# Patient Record
Sex: MALE | Race: WHITE | Employment: OTHER | URBAN - METROPOLITAN AREA
[De-identification: names, ages, dates, MRNs, and addresses within clinical notes are randomized per-mention and may not be internally consistent; named-entity substitution may affect disease eponyms.]

---

## 2021-01-06 ENCOUNTER — APPOINTMENT (OUTPATIENT)
Dept: ULTRASOUND IMAGING | Age: 76
End: 2021-01-06
Attending: EMERGENCY MEDICINE

## 2021-01-06 ENCOUNTER — HOSPITAL ENCOUNTER (EMERGENCY)
Age: 76
Discharge: HOME | End: 2021-01-06
Attending: EMERGENCY MEDICINE

## 2021-01-06 VITALS
BODY MASS INDEX: 24.79 KG/M2 | SYSTOLIC BLOOD PRESSURE: 140 MMHG | TEMPERATURE: 98.6 F | WEIGHT: 183 LBS | OXYGEN SATURATION: 93 % | HEART RATE: 95 BPM | HEIGHT: 72 IN | DIASTOLIC BLOOD PRESSURE: 90 MMHG | RESPIRATION RATE: 20 BRPM

## 2021-01-06 DIAGNOSIS — I82.4Y2 ACUTE DEEP VEIN THROMBOSIS (DVT) OF PROXIMAL VEIN OF LEFT LOWER EXTREMITY (HCC): Primary | ICD-10-CM

## 2021-01-06 PROCEDURE — 93971 EXTREMITY STUDY: CPT

## 2021-01-06 PROCEDURE — 99284 EMERGENCY DEPT VISIT MOD MDM: CPT

## 2021-01-06 PROCEDURE — 74011250637 HC RX REV CODE- 250/637: Performed by: EMERGENCY MEDICINE

## 2021-01-06 RX ADMIN — APIXABAN 10 MG: 5 TABLET, FILM COATED ORAL at 14:41

## 2021-01-06 NOTE — ED NOTES
I have reviewed discharge instructions with the patient. The patient verbalized understanding. Patient left ED via Discharge Method: ambulatory to Home with adult daughter. Opportunity for questions and clarification provided. Patient given 1 scripts. To continue your aftercare when you leave the hospital, you may receive an automated call from our care team to check in on how you are doing. This is a free service and part of our promise to provide the best care and service to meet your aftercare needs.  If you have questions, or wish to unsubscribe from this service please call [phone]. Thank you for Choosing our [facility].

## 2021-01-06 NOTE — DISCHARGE INSTRUCTIONS
Follow-up with your doctor at the [facility] within a week. Return to the emergency department if your symptoms worsen or if you are unable to get your prescription filled for what ever reason.

## 2021-01-06 NOTE — ED TRIAGE NOTES
Pt to triage in wheelchair from [place] after suspicion of blood clot in left leg and is warm to the touch and stretched out and cold foot. Blood clots in bilateral legs in 2016, denies taking blood thinners. Pt having shob and wears 2 L at home.

## 2021-01-20 ENCOUNTER — APPOINTMENT (OUTPATIENT)
Dept: CT IMAGING | Age: 76
End: 2021-01-20
Attending: EMERGENCY MEDICINE
Payer: OTHER GOVERNMENT

## 2021-01-20 ENCOUNTER — HOSPITAL ENCOUNTER (EMERGENCY)
Age: 76
Discharge: HOME | End: 2021-01-20
Attending: EMERGENCY MEDICINE
Payer: OTHER GOVERNMENT

## 2021-01-20 VITALS
OXYGEN SATURATION: 98 % | HEART RATE: 83 BPM | TEMPERATURE: 98.3 F | BODY MASS INDEX: 24.79 KG/M2 | HEIGHT: 72 IN | RESPIRATION RATE: 19 BRPM | DIASTOLIC BLOOD PRESSURE: 78 MMHG | WEIGHT: 183 LBS | SYSTOLIC BLOOD PRESSURE: 134 MMHG

## 2021-01-20 DIAGNOSIS — I82.412 ACUTE DEEP VEIN THROMBOSIS (DVT) OF FEMORAL VEIN OF LEFT LOWER EXTREMITY (HCC): Primary | ICD-10-CM

## 2021-01-20 DIAGNOSIS — I73.9 CLAUDICATION (HCC): ICD-10-CM

## 2021-01-20 LAB
ALBUMIN SERPL-MCNC: 3.6 G/DL (ref 3.2–4.6)
ALBUMIN/GLOB SERPL: 1 {RATIO} (ref 1.2–3.5)
ALP SERPL-CCNC: 132 U/L (ref 50–136)
ALT SERPL-CCNC: 28 U/L (ref 12–65)
ANION GAP SERPL CALC-SCNC: 9 MMOL/L (ref 7–16)
APTT PPP: 35.8 SEC (ref 24.1–35.1)
AST SERPL-CCNC: 19 U/L (ref 15–37)
ATRIAL RATE: 83 BPM
BASOPHILS # BLD: 0.1 K/UL (ref 0–0.2)
BASOPHILS NFR BLD: 1 % (ref 0–2)
BILIRUB SERPL-MCNC: 0.3 MG/DL (ref 0.2–1.1)
BNP SERPL-MCNC: 193 PG/ML
BUN SERPL-MCNC: 16 MG/DL (ref 8–23)
CALCIUM SERPL-MCNC: 9.4 MG/DL (ref 8.3–10.4)
CALCULATED P AXIS, ECG09: 94 DEGREES
CALCULATED R AXIS, ECG10: 79 DEGREES
CALCULATED T AXIS, ECG11: 87 DEGREES
CHLORIDE SERPL-SCNC: 102 MMOL/L (ref 98–107)
CO2 SERPL-SCNC: 28 MMOL/L (ref 21–32)
CREAT SERPL-MCNC: 1.3 MG/DL (ref 0.8–1.5)
DIAGNOSIS, 93000: NORMAL
DIFFERENTIAL METHOD BLD: ABNORMAL
EOSINOPHIL # BLD: 0.2 K/UL (ref 0–0.8)
EOSINOPHIL NFR BLD: 2 % (ref 0.5–7.8)
ERYTHROCYTE [DISTWIDTH] IN BLOOD BY AUTOMATED COUNT: 12.9 % (ref 11.9–14.6)
GLOBULIN SER CALC-MCNC: 3.7 G/DL (ref 2.3–3.5)
GLUCOSE SERPL-MCNC: 127 MG/DL (ref 65–100)
HCT VFR BLD AUTO: 43.5 % (ref 41.1–50.3)
HGB BLD-MCNC: 14.6 G/DL (ref 13.6–17.2)
IMM GRANULOCYTES # BLD AUTO: 0 K/UL (ref 0–0.5)
IMM GRANULOCYTES NFR BLD AUTO: 0 % (ref 0–5)
INR PPP: 1.3
LYMPHOCYTES # BLD: 3 K/UL (ref 0.5–4.6)
LYMPHOCYTES NFR BLD: 29 % (ref 13–44)
MAGNESIUM SERPL-MCNC: 2.2 MG/DL (ref 1.8–2.4)
MCH RBC QN AUTO: 33.1 PG (ref 26.1–32.9)
MCHC RBC AUTO-ENTMCNC: 33.6 G/DL (ref 31.4–35)
MCV RBC AUTO: 98.6 FL (ref 79.6–97.8)
MONOCYTES # BLD: 0.9 K/UL (ref 0.1–1.3)
MONOCYTES NFR BLD: 8 % (ref 4–12)
NEUTS SEG # BLD: 6.2 K/UL (ref 1.7–8.2)
NEUTS SEG NFR BLD: 60 % (ref 43–78)
NRBC # BLD: 0 K/UL (ref 0–0.2)
P-R INTERVAL, ECG05: 164 MS
PLATELET # BLD AUTO: 291 K/UL (ref 150–450)
PMV BLD AUTO: 9.4 FL (ref 9.4–12.3)
POTASSIUM SERPL-SCNC: 3.6 MMOL/L (ref 3.5–5.1)
PROT SERPL-MCNC: 7.3 G/DL (ref 6.3–8.2)
PROTHROMBIN TIME: 16.4 SEC (ref 12.5–14.7)
Q-T INTERVAL, ECG07: 344 MS
QRS DURATION, ECG06: 80 MS
QTC CALCULATION (BEZET), ECG08: 404 MS
RBC # BLD AUTO: 4.41 M/UL (ref 4.23–5.6)
SODIUM SERPL-SCNC: 139 MMOL/L (ref 136–145)
TROPONIN-HIGH SENSITIVITY: 10 PG/ML (ref 0–14)
VENTRICULAR RATE, ECG03: 83 BPM
WBC # BLD AUTO: 10.3 K/UL (ref 4.3–11.1)

## 2021-01-20 PROCEDURE — 93005 ELECTROCARDIOGRAM TRACING: CPT | Performed by: EMERGENCY MEDICINE

## 2021-01-20 PROCEDURE — 85730 THROMBOPLASTIN TIME PARTIAL: CPT

## 2021-01-20 PROCEDURE — 99285 EMERGENCY DEPT VISIT HI MDM: CPT

## 2021-01-20 PROCEDURE — 83735 ASSAY OF MAGNESIUM: CPT

## 2021-01-20 PROCEDURE — 85025 COMPLETE CBC W/AUTO DIFF WBC: CPT

## 2021-01-20 PROCEDURE — 80053 COMPREHEN METABOLIC PANEL: CPT

## 2021-01-20 PROCEDURE — 83880 ASSAY OF NATRIURETIC PEPTIDE: CPT

## 2021-01-20 PROCEDURE — 85610 PROTHROMBIN TIME: CPT

## 2021-01-20 PROCEDURE — 74011000258 HC RX REV CODE- 258: Performed by: EMERGENCY MEDICINE

## 2021-01-20 PROCEDURE — 74011000636 HC RX REV CODE- 636: Performed by: EMERGENCY MEDICINE

## 2021-01-20 PROCEDURE — 71260 CT THORAX DX C+: CPT

## 2021-01-20 PROCEDURE — 84484 ASSAY OF TROPONIN QUANT: CPT

## 2021-01-20 RX ORDER — GABAPENTIN 300 MG/1
300 CAPSULE ORAL
COMMUNITY

## 2021-01-20 RX ORDER — GABAPENTIN 300 MG/1
300 CAPSULE ORAL 2 TIMES DAILY
COMMUNITY

## 2021-01-20 RX ORDER — BUDESONIDE AND FORMOTEROL FUMARATE DIHYDRATE 160; 4.5 UG/1; UG/1
2 AEROSOL RESPIRATORY (INHALATION) 2 TIMES DAILY
COMMUNITY

## 2021-01-20 RX ORDER — OMEPRAZOLE 20 MG/1
20 CAPSULE, DELAYED RELEASE ORAL 2 TIMES DAILY
COMMUNITY

## 2021-01-20 RX ORDER — SIMVASTATIN 40 MG/1
20 TABLET, FILM COATED ORAL
COMMUNITY

## 2021-01-20 RX ORDER — FUROSEMIDE 20 MG/1
20 TABLET ORAL DAILY
COMMUNITY

## 2021-01-20 RX ORDER — SODIUM CHLORIDE 0.9 % (FLUSH) 0.9 %
10 SYRINGE (ML) INJECTION
Status: COMPLETED | OUTPATIENT
Start: 2021-01-20 | End: 2021-01-20

## 2021-01-20 RX ORDER — HYDROCODONE BITARTRATE AND ACETAMINOPHEN 10; 325 MG/1; MG/1
1 TABLET ORAL
COMMUNITY

## 2021-01-20 RX ORDER — BISMUTH SUBSALICYLATE 262 MG
1 TABLET,CHEWABLE ORAL DAILY
COMMUNITY

## 2021-01-20 RX ORDER — DIPHENHYDRAMINE HCL 25 MG
75 TABLET ORAL DAILY
COMMUNITY

## 2021-01-20 RX ORDER — ASCORBIC ACID 500 MG
500 TABLET ORAL DAILY
COMMUNITY

## 2021-01-20 RX ORDER — HYDROCODONE BITARTRATE AND ACETAMINOPHEN 5; 325 MG/1; MG/1
1 TABLET ORAL ONCE
Status: DISCONTINUED | OUTPATIENT
Start: 2021-01-20 | End: 2021-01-20 | Stop reason: HOSPADM

## 2021-01-20 RX ORDER — TAMSULOSIN HYDROCHLORIDE 0.4 MG/1
0.4 CAPSULE ORAL DAILY
COMMUNITY

## 2021-01-20 RX ADMIN — SODIUM CHLORIDE 100 ML: 900 INJECTION, SOLUTION INTRAVENOUS at 13:18

## 2021-01-20 RX ADMIN — Medication 10 ML: at 13:18

## 2021-01-20 RX ADMIN — IOPAMIDOL 100 ML: 755 INJECTION, SOLUTION INTRAVENOUS at 13:18

## 2021-01-20 NOTE — ED NOTES
I have reviewed discharge instructions with the patient. The patient verbalized understanding. Patient left ED via Discharge Method: wheelchair to Home with family. Opportunity for questions and clarification provided. Patient given 0 scripts. To continue your aftercare when you leave the hospital, you may receive an automated call from our care team to check in on how you are doing. This is a free service and part of our promise to provide the best care and service to meet your aftercare needs.  If you have questions, or wish to unsubscribe from this service please call [phone]. Thank you for Choosing our [facility] Emergency Department.

## 2021-01-20 NOTE — ED TRIAGE NOTES
Pt to triage in wheelchair from [place] complaining of left foot pain. States he was diagnosed with blood clot in the foot 2 weeks ago and it has gotten bigger since then. Pt o2 89% in triage [facility] O2, states he wears 2L at home. Pt has been taking 2 different blood thinners since discharged 2 weeks ago every 12 hours. Foot warm to touch with 2+ pitting edema.

## 2021-01-20 NOTE — ED PROVIDER NOTES
Diana Taveras is a 67 y.o. male seen on 1/20/2021 in the [facility] EMERGENCY DEPT in room ER12/12. Chief Complaint   Patient presents with    Blood Clot     HPI: 69-year-old male with known left lower extremity blood clot presents the emergency department with increased pain and swelling. Patient was seen in our emergency department on 1/06/21 and diagnosed with a DVT of his left lower extremity from common femoral vein distally. Patient was not having any chest pain or shortness of breath and was discharged with prescription for Eliquis. Patient states that he went to the pharmacy and he was told that the Eliquis would cost him $715 therefore he did not get it filled. Patient states that he called the [place] and they provided him with a different blood thinner. He states that he finally received this 4 days ago and began taking it twice a day. He is unsure what it is called. Patient presented today because of increasing pain and swelling. He states that he felt like he should be getting better and he is not yet. Patient denies any new symptoms of chest pain, shortness of breath or any other concerns. Patient is on chronic oxygen supplementation secondary to his COPD. He has no other complaints this time. Historian: Patient/Previous Medical Record    REVIEW OF SYSTEMS     Review of Systems   Constitutional: Negative. HENT: Negative. Respiratory: Positive for shortness of breath (Chronic). Cardiovascular: Positive for leg swelling. Gastrointestinal: Negative. Genitourinary: Negative. Musculoskeletal: Negative. Skin: Negative. Neurological: Negative. Psychiatric/Behavioral: Negative. All other systems reviewed and are negative. PAST MEDICAL HISTORY     No past medical history on file. No past surgical history on file.   Social History     Socioeconomic History    Marital status:      Spouse name: Not on file    Number of children: Not on file    Years of education: Not on file    Highest education level: Not on file     Prior to Admission Medications   Prescriptions Last Dose Informant Patient Reported? Taking? HYDROcodone-acetaminophen (NORCO)  mg tablet 1/20/2021 at Unknown time  Yes Yes   Sig: Take 1 Tab by mouth every six (6) hours as needed for Pain. ascorbic acid, vitamin C, (Vitamin C) 500 mg tablet 1/20/2021 at Unknown time  Yes Yes   Sig: Take 500 mg by mouth daily. budesonide-formoteroL (Symbicort) 160-4.5 mcg/actuation HFAA 1/20/2021 at Unknown time  Yes Yes   Sig: Take 2 Puffs by inhalation two (2) times a day. diphenhydrAMINE (Benadryl Allergy) 25 mg tablet 1/20/2021 at Unknown time  Yes Yes   Sig: Take 75 mg by mouth daily. furosemide (Lasix) 20 mg tablet 1/20/2021 at Unknown time  Yes Yes   Sig: Take 20 mg by mouth daily. gabapentin (NEURONTIN) 300 mg capsule 1/20/2021 at Unknown time  Yes Yes   Sig: Take 300 mg by mouth two (2) times a day. 0800 and 1200   gabapentin (NEURONTIN) 300 mg capsule 1/19/2021 at Unknown time  Yes Yes   Sig: Take 300 mg by mouth nightly. 2000   multivitamin (ONE A DAY) tablet 1/20/2021 at Unknown time  Yes Yes   Sig: Take 1 Tab by mouth daily. omeprazole (PRILOSEC) 20 mg capsule 1/20/2021 at Unknown time  Yes Yes   Sig: Take 20 mg by mouth two (2) times a day. rivaroxaban (XARELTO) 15 mg tab tablet 1/20/2021 at Unknown time  Yes Yes   Sig: Take 15 mg by mouth two (2) times daily (with meals). simvastatin (ZOCOR) 40 mg tablet 1/19/2021 at Unknown time  Yes Yes   Sig: Take 20 mg by mouth nightly. tamsulosin (FLOMAX) 0.4 mg capsule 1/20/2021 at Unknown time  Yes Yes   Sig: Take 0.4 mg by mouth daily. vitamin P-H-N-lutein-minerals (OCUVITE) tablet 1/20/2021 at Unknown time  Yes Yes   Sig: Take 1 Tab by mouth daily.       Facility-Administered Medications: None     Allergies   Allergen Reactions    Morphine Anaphylaxis        PHYSICAL EXAM       Vitals: 01/20/21 1140 01/20/21 1159 01/20/21 1220 01/20/21 1259   BP: 129/80 128/81 122/79 113/72   Pulse: 87 82 94 85   Resp:  14 17 19   Temp:       SpO2: 96% 96% 96% 95%    Vital signs were reviewed. Physical Exam  Vitals signs and nursing note reviewed. Constitutional:       General: He is not in acute distress. Appearance: Normal appearance. He is not ill-appearing or toxic-appearing. HENT:      Head: Atraumatic. Mouth/Throat:      Mouth: Mucous membranes are moist.   Eyes:      Extraocular Movements: Extraocular movements intact. Neck:      Musculoskeletal: Normal range of motion. Cardiovascular:      Rate and Rhythm: Regular rhythm. Tachycardia present. Pulmonary:      Breath sounds: No wheezing or rhonchi. Comments: Conversational dyspnea and tachypnea which patient describes as his baseline shortness of breath  Chest:      Chest wall: No tenderness. Abdominal:      Palpations: Abdomen is soft. Tenderness: There is no abdominal tenderness. Musculoskeletal:         General: Swelling and tenderness present. Left lower leg: Edema present. Comments: Patient with mild swelling of his left lower extremity with mild warmth and erythema. Patient has good capillary refill. He has some mild calf tenderness to palpation. There is +1 pitting edema to the left lower extremity. Skin:     General: Skin is warm and dry. Neurological:      General: No focal deficit present. Mental Status: He is alert and oriented to person, place, and time. Psychiatric:         Mood and Affect: Mood normal.         Behavior: Behavior normal.         Thought Content:  Thought content normal.         Judgment: Judgment normal.          MEDICAL DECISION MAKING     ED Course:    Recent Results (from the past 8 hour(s))   CBC WITH AUTOMATED DIFF    Collection Time: 01/20/21 10:57 AM   Result Value Ref Range    WBC 10.3 4.3 - 11.1 K/uL    RBC 4.41 4.23 - 5.6 M/uL    HGB 14.6 13.6 - 17.2 g/dL HCT 43.5 41.1 - 50.3 %    MCV 98.6 (H) 79.6 - 97.8 FL    MCH 33.1 (H) 26.1 - 32.9 PG    MCHC 33.6 31.4 - 35.0 g/dL    RDW 12.9 11.9 - 14.6 %    PLATELET 492 948 - 355 K/uL    MPV 9.4 9.4 - 12.3 FL    ABSOLUTE NRBC 0.00 0.0 - 0.2 K/uL    DF AUTOMATED      NEUTROPHILS 60 43 - 78 %    LYMPHOCYTES 29 13 - 44 %    MONOCYTES 8 4.0 - 12.0 %    EOSINOPHILS 2 0.5 - 7.8 %    BASOPHILS 1 0.0 - 2.0 %    IMMATURE GRANULOCYTES 0 0.0 - 5.0 %    ABS. NEUTROPHILS 6.2 1.7 - 8.2 K/UL    ABS. LYMPHOCYTES 3.0 0.5 - 4.6 K/UL    ABS. MONOCYTES 0.9 0.1 - 1.3 K/UL    ABS. EOSINOPHILS 0.2 0.0 - 0.8 K/UL    ABS. BASOPHILS 0.1 0.0 - 0.2 K/UL    ABS. IMM. GRANS. 0.0 0.0 - 0.5 K/UL   METABOLIC PANEL, COMPREHENSIVE    Collection Time: 01/20/21 10:57 AM   Result Value Ref Range    Sodium 139 136 - 145 mmol/L    Potassium 3.6 3.5 - 5.1 mmol/L    Chloride 102 98 - 107 mmol/L    CO2 28 21 - 32 mmol/L    Anion gap 9 7 - 16 mmol/L    Glucose 127 (H) 65 - 100 mg/dL    BUN 16 8 - 23 MG/DL    Creatinine 1.30 0.8 - 1.5 MG/DL    GFR est AA >60 >60 ml/min/1.73m2    GFR est non-AA 57 (L) >60 ml/min/1.73m2    Calcium 9.4 8.3 - 10.4 MG/DL    Bilirubin, total 0.3 0.2 - 1.1 MG/DL    ALT (SGPT) 28 12 - 65 U/L    AST (SGOT) 19 15 - 37 U/L    Alk.  phosphatase 132 50 - 136 U/L    Protein, total 7.3 6.3 - 8.2 g/dL    Albumin 3.6 3.2 - 4.6 g/dL    Globulin 3.7 (H) 2.3 - 3.5 g/dL    A-G Ratio 1.0 (L) 1.2 - 3.5     PROTHROMBIN TIME + INR    Collection Time: 01/20/21 10:57 AM   Result Value Ref Range    Prothrombin time 16.4 (H) 12.5 - 14.7 sec    INR 1.3     PTT    Collection Time: 01/20/21 10:57 AM   Result Value Ref Range    aPTT 35.8 (H) 24.1 - 35.1 SEC   MAGNESIUM    Collection Time: 01/20/21 10:57 AM   Result Value Ref Range    Magnesium 2.2 1.8 - 2.4 mg/dL   NT-PRO BNP    Collection Time: 01/20/21 10:57 AM   Result Value Ref Range    NT pro- <450 PG/ML     Ct Chest W Cont    Result Date: 1/20/2021  CT CHEST WITH CONTRAST  1/20/2021 1:27 PM HISTORY:  Proximal DVT; patient is on anticoagulation for known DVT. Low oxygen saturation today. TECHNIQUE: The patient received 100 mL Isovue-370 nonionic IV contrast and axial images were obtained through the chest. Coronal reformatted images were generated. All CT scans at this facility used dose modulation, interactive reconstruction and/or weight based dosing when appropriate to reduce radiation dose to as low as reasonably achievable. COMPARISON:  None. FINDINGS: There are no filling defects within the main or proximal segmental pulmonary artery suggestive of emboli. The thoracic aorta is well-opacified without dissection. Atherosclerotic mural plaque is present in the descending thoracic aorta. There is no thoracic adenopathy. There is no consolidation, pleural effusions or pulmonary edema. Extensive centrilobular emphysematous disease is present. There is minimal atelectasis or consolidation posterior right lower lobe and there is partial opacification of small distal airways in the medial right lower lobe. A subcutaneous mass in the presternal soft tissues measures 3.7 cm in diameter (image 63). Included portions of the upper abdomen demonstrate normal-appearing adrenal glands. A small hiatal hernia is present. Cholecystectomy clips are present. There is mild nodular enlargement of both adrenal glands. Bone windows demonstrate no aggressive osseous lesions. 1. No pulmonary embolism 2. COPD. 3. 3.7 cm presternal subcutaneous mass. Correlate with physical evaluation. 4. Several small airways in the peripheral right lower lobe are opacified and there is minimal atelectasis or consolidation in the medial right lower lobe.        MDM  Number of Diagnoses or Management Options  Acute deep vein thrombosis (DVT) of femoral vein of left lower extremity (HCC)  Claudication [facility])  Diagnosis management comments: 69-year-old female presented emergency department with complaints of pain to his left lower extremity secondary to a known blood clot. Patient labs are reassuring. Patient has only been taking his Xarelto for 4 days as he did not get his original prescription filled 2 weeks ago. Discussed at length with patient concerning that his leg would take an extended period of time to improve. Patient encouraged to continue his Xarelto and to follow-up with the [place]. Patient also given strict instructions to return for increased shortness of breath or chest pain. He is agreeable to this plan. Amount and/or Complexity of Data Reviewed  Clinical lab tests: ordered and reviewed  Tests in the radiology section of CPT®: reviewed and ordered  Decide to obtain previous medical records or to obtain history from someone other than the patient: yes  Review and summarize past medical records: yes  Independent visualization of images, tracings, or specimens: yes    Patient Progress  Patient progress: stable        Disposition:  Discharged  Diagnosis:     ICD-10-CM ICD-9-CM   1. Acute deep vein thrombosis (DVT) of femoral vein of left lower extremity ([facility])  I82.412 453.41   2. Claudication ([facility])  I73.9 443.9     ____________________________________________________________________  A portion of this note was generated using voice recognition dictation software. While the note has been reviewed for accuracy, please note certain words and phrases may not be transcribed as intended and some grammatical and/or typographical errors may be present.